# Patient Record
Sex: MALE | Race: WHITE
[De-identification: names, ages, dates, MRNs, and addresses within clinical notes are randomized per-mention and may not be internally consistent; named-entity substitution may affect disease eponyms.]

---

## 2020-06-11 ENCOUNTER — HOSPITAL ENCOUNTER (EMERGENCY)
Dept: HOSPITAL 50 - VM.ED | Age: 35
Discharge: ANOTHER HOSPITAL | End: 2020-06-11
Payer: SELF-PAY
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
## 2020-06-11 DIAGNOSIS — S80.11XA: ICD-10-CM

## 2020-06-11 DIAGNOSIS — S06.9X9A: ICD-10-CM

## 2020-06-11 DIAGNOSIS — S01.01XA: ICD-10-CM

## 2020-06-11 DIAGNOSIS — S01.112A: ICD-10-CM
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
## 2020-06-11 DIAGNOSIS — J96.00: Primary | ICD-10-CM

## 2020-06-11 DIAGNOSIS — S70.211A: ICD-10-CM

## 2020-06-11 DIAGNOSIS — E87.6: ICD-10-CM

## 2020-06-11 LAB
ANION GAP SERPL CALC-SCNC: 15.8 MMOL/L (ref 10–20)
APTT PPP: 24.6 SEC (ref 25.6–32.8)
BARBITURATES UR QL SCN: NEGATIVE
BENZODIAZ UR QL SCN: NEGATIVE
CHLORIDE SERPL-SCNC: 99 MMOL/L (ref 98–107)
EDDP,URINE SCREEN: NEGATIVE
METHADONE UR QL SCN: POSITIVE
SODIUM SERPL-SCNC: 140 MMOL/L (ref 136–145)
TCA SCREEN,URINE: NEGATIVE
THC UR QL SCN>50 NG/ML: NEGATIVE

## 2020-06-11 PROCEDURE — 99291 CRITICAL CARE FIRST HOUR: CPT

## 2020-06-11 PROCEDURE — 80305 DRUG TEST PRSMV DIR OPT OBS: CPT

## 2020-06-11 PROCEDURE — 83605 ASSAY OF LACTIC ACID: CPT

## 2020-06-11 PROCEDURE — 31500 INSERT EMERGENCY AIRWAY: CPT

## 2020-06-11 PROCEDURE — 80307 DRUG TEST PRSMV CHEM ANLYZR: CPT

## 2020-06-11 PROCEDURE — 80053 COMPREHEN METABOLIC PANEL: CPT

## 2020-06-11 PROCEDURE — 85025 COMPLETE CBC W/AUTO DIFF WBC: CPT

## 2020-06-11 PROCEDURE — 71045 X-RAY EXAM CHEST 1 VIEW: CPT

## 2020-06-11 PROCEDURE — 85730 THROMBOPLASTIN TIME PARTIAL: CPT

## 2020-06-11 PROCEDURE — 72170 X-RAY EXAM OF PELVIS: CPT

## 2020-06-11 PROCEDURE — 99285 EMERGENCY DEPT VISIT HI MDM: CPT

## 2020-06-11 PROCEDURE — 85610 PROTHROMBIN TIME: CPT

## 2020-06-11 PROCEDURE — 81001 URINALYSIS AUTO W/SCOPE: CPT

## 2020-06-11 PROCEDURE — 36415 COLL VENOUS BLD VENIPUNCTURE: CPT
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
## 2020-06-11 NOTE — CR
______________________________________________________________________________   

  

[id number] RAD/RAD Chest PA or AP 1V  

EXAM: FRONTAL CHEST  

   

 INDICATION: TRAUMA/INTUBATION.  

   

 COMPARISON: None.  

   

 DISCUSSION: Endotracheal tube tip is about 24 mm above the james. The  

 nasogastric tube tip is in the gastric body. No infiltrates are identified.  

 Normal heart size. No pleural fluid or pneumothoraces are identified on this  

 portable examination. Widening of the right acromioclavicular and  

 coracoclavicular intervals are suggestive of a separation injury.  

   

 IMPRESSION:    

 1.  Changes consistent with right acromioclavicular separation injury involving  

 the coracoclavicular and acromioclavicular ligaments.  

 2.  Nasogastric and endotracheal tubes in satisfactory position.  

   

 Electronically signed by Aj Ha MD on 6/11/2020 7:44 AM  

   

  

Aj Ha MD                 

 06/11/20 0747    

  

Thank you for allowing us to participate in the care of your patient.

## 2020-06-11 NOTE — CR
______________________________________________________________________________   

  

[id number] RAD/RAD Pelvis 1-2V  

EXAM: AP PELVIS  

   

 CLINICAL DATA: TRAUMA.   

   

 COMPARISON: None.  

   

 FINDINGS:  

 There is dislocation of the right hip. No fracture or other osseous abnormality  

 is seen, but evaluation is somewhat limited by the overlying trauma board.  

   

 IMPRESSION:  

 1.  Right hip dislocation.  

   

 Electronically signed by Aj Ha MD on 6/11/2020 7:46 AM  

   

  

Aj Ha MD                 

 06/11/20 0748    

  

Thank you for allowing us to participate in the care of your patient.